# Patient Record
Sex: MALE | Race: OTHER | URBAN - METROPOLITAN AREA
[De-identification: names, ages, dates, MRNs, and addresses within clinical notes are randomized per-mention and may not be internally consistent; named-entity substitution may affect disease eponyms.]

---

## 2019-03-11 ENCOUNTER — OFFICE VISIT (OUTPATIENT)
Dept: INTERNAL MEDICINE CLINIC | Facility: CLINIC | Age: 44
End: 2019-03-11
Payer: COMMERCIAL

## 2019-03-11 VITALS
OXYGEN SATURATION: 97 % | DIASTOLIC BLOOD PRESSURE: 86 MMHG | SYSTOLIC BLOOD PRESSURE: 136 MMHG | WEIGHT: 195 LBS | BODY MASS INDEX: 27.92 KG/M2 | HEIGHT: 70 IN | HEART RATE: 85 BPM

## 2019-03-11 DIAGNOSIS — K40.90 UNILATERAL INGUINAL HERNIA WITHOUT OBSTRUCTION OR GANGRENE, RECURRENCE NOT SPECIFIED: ICD-10-CM

## 2019-03-11 DIAGNOSIS — K62.5 RECTAL BLEEDING: ICD-10-CM

## 2019-03-11 DIAGNOSIS — L30.9 DERMATITIS: ICD-10-CM

## 2019-03-11 DIAGNOSIS — Z12.5 SCREENING PSA (PROSTATE SPECIFIC ANTIGEN): ICD-10-CM

## 2019-03-11 DIAGNOSIS — Z00.00 ADULT GENERAL MEDICAL EXAM: Primary | ICD-10-CM

## 2019-03-11 PROBLEM — L91.8 SKIN TAG: Status: ACTIVE | Noted: 2019-03-11

## 2019-03-11 PROCEDURE — 99386 PREV VISIT NEW AGE 40-64: CPT | Performed by: INTERNAL MEDICINE

## 2019-03-11 RX ORDER — CLOTRIMAZOLE AND BETAMETHASONE DIPROPIONATE 10; .64 MG/G; MG/G
1 CREAM TOPICAL 2 TIMES DAILY PRN
Qty: 60 G | Refills: 3 | Status: SHIPPED | OUTPATIENT
Start: 2019-03-11 | End: 2020-06-26

## 2019-03-11 NOTE — PROGRESS NOTES
Lyo Chaudhry is a 41year old male. Patient presents with:  Physical: yearly exam  Rectal Bleeding: at times    HPI:   61-year-old gentleman with no significant medical history here for physical exam.  He reports that he occasionally noticed blood in the st adult)   Pulse 85   Ht 5' 10\" (1.778 m)   Wt 195 lb (88.5 kg)   SpO2 97%   BMI 27.98 kg/m²      Physical Exam    Constitutional: He is oriented to person, place, and time. He appears well-developed and well-nourished. HENT:   Head: Normocephalic.    Eyes GASTRO - INTERNAL    5. Dermatitis  Will provide with Lotrisone. If there is no improvement can see Derm. - DERM - INTERNAL    Plan: Labs ordered. Colonoscopy referral given. Dermatology referral given. Surgery referral given.     Addendum dated March

## 2020-06-26 ENCOUNTER — TELEPHONE (OUTPATIENT)
Dept: INTERNAL MEDICINE CLINIC | Facility: CLINIC | Age: 45
End: 2020-06-26

## 2020-06-26 RX ORDER — CLOTRIMAZOLE AND BETAMETHASONE DIPROPIONATE 10; .64 MG/G; MG/G
1 CREAM TOPICAL 2 TIMES DAILY PRN
Qty: 60 G | Refills: 3 | Status: SHIPPED | OUTPATIENT
Start: 2020-06-26

## 2020-06-26 NOTE — TELEPHONE ENCOUNTER
Clotrimazole betamethasone cream has been refilled to the pharmacy.   Please let patient know thank you

## 2020-08-06 ENCOUNTER — TELEPHONE (OUTPATIENT)
Dept: INTERNAL MEDICINE CLINIC | Facility: CLINIC | Age: 45
End: 2020-08-06

## 2020-08-06 DIAGNOSIS — S13.9XXS NECK SPRAIN, SEQUELA: Primary | ICD-10-CM

## 2020-08-06 NOTE — TELEPHONE ENCOUNTER
Talked to patient   Has a muscle pull while exercising  Pt currently in [place]   No numbness or tingling , neck hurts with activity   Will refer for therapy , try NSAIDS with meals , may need muscle relaxant.